# Patient Record
(demographics unavailable — no encounter records)

---

## 2024-11-14 NOTE — HISTORY OF PRESENT ILLNESS
[Disease:__________________________] : Disease: [unfilled] [de-identified] : 43 yo F presents to the office today for initial consultation for Iron Deficiency Anemia. Referred by PCP Moises FARRAR Pt has a PMHx of migraines, left shoulder pinched nerve. Pt has no PSHx. Pt has a FHx of Mother and Father have DM, maternal aunt and uncle Cancer not sure Social History: no drink, no smoke, , 4 children, vegetarian    She was told she was anemic since  with her pregnancies She has taken PO Iron before but stopped because it causes GI upset, bloating, constipation and nausea She has received Iron Infusions back in 2017. She never had a BLT  Medication- Gabapentin  Menstrual cycle- regular, lasting 6 days first 2 days heavy  Allergies- none    Pt states fatigue, dizziness, leg pain with periods, feels a little cold, migraines.  Pt denies fever, diarrhea, fatigue, chills, nausea, vomiting, SOB, dyspnea, unintentional weight loss or bleeding. Pt denies palpitations, weakness, Pica.  Pt has not seen any blood in the stools or melena. No epistaxis, hematemesis or hemoptysis.   Healthcare Maintenace: PCP- Moises FARRAR OBGYN-  (doesn't remember the name)  GI- does not have one but will look for one  Colonoscopy- none Upper Endoscopy- none  Mammography- none GYN Pelvic Exam/pap-  WNL Breast Exam- , self-exams  LMP- 2024 GPA- A4 (4 miscarries)    Labs on 2024 reviewed and discussed with patient. WBC 3.9, Hgb 10.1, Hct 31.3, , MCV 88 Ferritin 8, Iron Serum 28, Iron sat 8, TIBC 365, UIBC 337 [de-identified] : 11/14/24 45 yo F presents to the office today for follow up for Iron Deficiency Anemia. s/p Feraheme 510mg IV weekly x2, last dose on 8/27/24 She tolerated well and had no reactions She is doing well overall today and has no complaints  She is taking PO Iron from her PCP which she is taking it once a week.  LMP- 11/5/24, lasted 7 days, first two days heavy  Pt denies fever, diarrhea, fatigue, chills, nausea, vomiting, SOB, dyspnea, unintentional weight loss or bleeding. Pt denies palpitations, headache, weakness, dizziness, Pica.  Pt has not seen any blood in the stools or melena. No epistaxis, hematemesis or hemoptysis. Labs on 11/5/24 reviewed and discussed with patient. WBC 5.25, Hgb 11.8, Hct 34.8, , MCV 89.9 Ferritin 200, Iron Serum 105, Iron sat 44, TIBC 238, UIBC 133

## 2024-11-14 NOTE — ASSESSMENT
[FreeTextEntry1] :  45 yo F presents to the office today for initial consultation for Iron Deficiency Anemia. Referred by PCP Moises FARRAR. She was told she was anemic since 2007 with her pregnancies. She has taken PO Iron before but stopped because it causes GI upset, bloating, constipation and nausea. She has received Iron Infusions back in 2017. She never had a BLT.  Menstrual cycle- regular, lasting 6 days first 2 days heavy. She is a vegetarian. Pt states fatigue, dizziness, leg pain with periods, feels a little cold, migraines.  Impression: Iron Deficiency Anemia    Plan: Previous chart and previous labs reviewed. Labs on 11/5/24 reviewed and discussed with patient. WBC 5.25, Hgb 11.8, Hct 34.8, , MCV 89.9 Ferritin 200, Iron Serum 105, Iron sat 44, TIBC 238, UIBC 133. --- Labs to be done 3 days prior when they RTC: CBC, Ferritin, TIBC --- s/p Feraheme 510mg IV weekly x2, last dose on 8/27/24 --- Continue taking PO Iron once a week with Vit-C --- Pt was educated on a high iron diet --- Patient was advised to find a GI and establish a workup --- Patient was advised to continue following up with OBGYN  Pt will follow up with their OBGYN, PCP, GI as directed. All questions were answered   RTC in 6 months with CAROL Addison

## 2024-11-14 NOTE — REVIEW OF SYSTEMS
[Constipation] : constipation [Joint Pain] : joint pain [Joint Stiffness] : joint stiffness [Negative] : Heme/Lymph [FreeTextEntry7] : PO Iron  [FreeTextEntry9] : neck and Left shoulder, "CT shows pinched nerve "